# Patient Record
Sex: MALE | Race: OTHER | NOT HISPANIC OR LATINO
[De-identification: names, ages, dates, MRNs, and addresses within clinical notes are randomized per-mention and may not be internally consistent; named-entity substitution may affect disease eponyms.]

---

## 2022-01-28 PROBLEM — Z00.00 ENCOUNTER FOR PREVENTIVE HEALTH EXAMINATION: Status: ACTIVE | Noted: 2022-01-28

## 2022-02-01 ENCOUNTER — NON-APPOINTMENT (OUTPATIENT)
Age: 52
End: 2022-02-01

## 2022-02-01 ENCOUNTER — APPOINTMENT (OUTPATIENT)
Dept: OTOLARYNGOLOGY | Facility: CLINIC | Age: 52
End: 2022-02-01
Payer: COMMERCIAL

## 2022-02-01 VITALS
BODY MASS INDEX: 25.48 KG/M2 | SYSTOLIC BLOOD PRESSURE: 131 MMHG | OXYGEN SATURATION: 98 % | HEIGHT: 70 IN | DIASTOLIC BLOOD PRESSURE: 80 MMHG | TEMPERATURE: 97.8 F | HEART RATE: 78 BPM | WEIGHT: 178 LBS

## 2022-02-01 DIAGNOSIS — Z78.9 OTHER SPECIFIED HEALTH STATUS: ICD-10-CM

## 2022-02-01 DIAGNOSIS — Z82.3 FAMILY HISTORY OF STROKE: ICD-10-CM

## 2022-02-01 DIAGNOSIS — M26.629 ARTHRALGIA OF TEMPOROMANDIBULAR JOINT,: ICD-10-CM

## 2022-02-01 DIAGNOSIS — Z83.3 FAMILY HISTORY OF DIABETES MELLITUS: ICD-10-CM

## 2022-02-01 DIAGNOSIS — Z80.9 FAMILY HISTORY OF MALIGNANT NEOPLASM, UNSPECIFIED: ICD-10-CM

## 2022-02-01 DIAGNOSIS — I26.99 OTHER PULMONARY EMBOLISM W/OUT ACUTE COR PULMONALE: ICD-10-CM

## 2022-02-01 DIAGNOSIS — F45.8 OTHER SOMATOFORM DISORDERS: ICD-10-CM

## 2022-02-01 PROCEDURE — 31575 DIAGNOSTIC LARYNGOSCOPY: CPT

## 2022-02-01 PROCEDURE — 69210 REMOVE IMPACTED EAR WAX UNI: CPT

## 2022-02-01 PROCEDURE — 99204 OFFICE O/P NEW MOD 45 MIN: CPT | Mod: 25

## 2022-02-01 RX ORDER — AZITHROMYCIN 250 MG/1
250 TABLET, FILM COATED ORAL
Qty: 6 | Refills: 0 | Status: DISCONTINUED | COMMUNITY
Start: 2021-11-26

## 2022-02-01 RX ORDER — IPRATROPIUM BROMIDE 42 UG/1
0.06 SPRAY NASAL
Qty: 15 | Refills: 0 | Status: ACTIVE | COMMUNITY
Start: 2021-11-26

## 2022-02-01 RX ORDER — OXYCODONE AND ACETAMINOPHEN 5; 325 MG/1; MG/1
5-325 TABLET ORAL
Qty: 20 | Refills: 0 | Status: DISCONTINUED | COMMUNITY
Start: 2022-01-07

## 2022-02-01 RX ORDER — DOXYCYCLINE HYCLATE 100 MG/1
100 CAPSULE ORAL
Qty: 20 | Refills: 0 | Status: DISCONTINUED | COMMUNITY
Start: 2022-01-04

## 2022-02-01 RX ORDER — APIXABAN 5 MG/1
5 TABLET, FILM COATED ORAL
Refills: 0 | Status: ACTIVE | COMMUNITY

## 2022-02-01 RX ORDER — PREDNISONE 10 MG/1
10 TABLET ORAL
Qty: 30 | Refills: 0 | Status: DISCONTINUED | COMMUNITY
Start: 2021-11-26

## 2022-02-01 NOTE — HISTORY OF PRESENT ILLNESS
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 During the workup a R sided thyroid mass was discovered. He brings records from an outside hospital. The patient denies a family history of thyroid disorders/cancers and has had no ionizing radiation exposure. No trouble speaking or swallowing. Weight is stable. 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 Bothersome bilateral tinnitus in quiet. Denies: vertigo, ear pain, drainage, frequent infections, hx of ear surgery or IV antibiotics/chemo. His mother had an acoustic and father may have had hearing loss. Qtip use: yes\par Chronic bruxism & TMJD; his dentist is aware but he doesn't have a . Cracked one tooth.

## 2022-02-01 NOTE — DATA REVIEWED
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

## 2022-02-01 NOTE — CONSULT LETTER
[Dear  ___] : Dear  [unfilled], [Consult Letter:] : I had the pleasure of evaluating your patient, [unfilled]. [Please see my note below.] : Please see my note below. [Consult Closing:] : Thank you very much for allowing me to participate in the care of this patient.  If you have any questions, please do not hesitate to contact me. [Sincerely,] : Sincerely, [FreeTextEntry3] : JESENIA Currie Jr, MD, FAAOHNS\par Otolaryngologist\par [facility]

## 2022-02-01 NOTE — ASSESSMENT
[FreeTextEntry1] : Discussed options & will proceed w/ sono-guided FNA which will probably not require holding his Eliquis. Will call w/ results. \par \par Discussed conservative TMJ treatment including hot soaks, NSAIDs, and chewing avoidance. Asked the patient to confer with their dentist regarding a possible night splint. Discussed bruxism & its possible contribution to TMJD. Reviewed relaxation exercises & a possible trial of muscle relaxants as well as possible eventual botox.\par \par RTC for an ear cleaning in 6 months; annual audios, sooner prn any changes. Discussed the importance not putting qtips or other foreign objects in the ears. Discussed appropriate use of hearing protection & loud noise avoidance.\par \par \par

## 2022-02-01 NOTE — PROCEDURE
[de-identified] : We discussed the elevated risk of liberation of viral particles from the nasopharynx if the patient were to be asymptomatically infected with COVID-19; after weighing the risks & benefits the decision was made to proceed. The procedure was performed while wearing appropriate PPE and a camera attached to a video system was used to maximize separation from the patient. The scope was handled appropriately. \par Indication: requirement for exam not possible via anterior examination; thyroid mass\par After verbal consent and NO administration of an aerosolized phenylephrine/lidocaine mix, examination was performed with a flexible endoscope placed through the nose. Findings:\par Nasopharynx: unremarkable\par Soft palate, lateral and posterior pharyngeal walls: unremarkable\par Base of tongue & lingual tonsil: minimal retrodisplacement\par Vallecula: unremarkable\par Epiglottis: unremarkable\par Piriform sinuses and pharyngoesophageal junction: unremarkable\par Arytenoids and AE folds: mild interarytenoid edema\par Ventricle and false vocal folds: unremarkable\par True vocal folds: Smooth free edge w/ mild TA bowing; surface without ectasias or edema; normal movement bilaterally with good apposition in phonation\par Visible subglottis: unremarkable\par Other findings: ELM

## 2022-02-01 NOTE — PHYSICAL EXAM
[Binocular Microscopic Exam] : Binocular microscopic exam was performed [Laryngoscopy Performed] : laryngoscopy was performed, see procedure section for findings [Normal] : assessment of respiratory effort is normal [de-identified] : large, deep R sided nodule [de-identified] : tender w/ opening bilaterally [FreeTextEntry8] : cerumen impaction removed with a hook [FreeTextEntry9] : cerumen impaction removed with a hook [de-identified] : ground-down incisors

## 2022-02-04 ENCOUNTER — RESULT REVIEW (OUTPATIENT)
Age: 52
End: 2022-02-04

## 2022-03-30 ENCOUNTER — OUTPATIENT (OUTPATIENT)
Dept: OUTPATIENT SERVICES | Facility: HOSPITAL | Age: 52
LOS: 1 days | End: 2022-03-30
Payer: COMMERCIAL

## 2022-03-30 ENCOUNTER — APPOINTMENT (OUTPATIENT)
Dept: ULTRASOUND IMAGING | Facility: HOSPITAL | Age: 52
End: 2022-03-30

## 2022-03-30 PROCEDURE — 10005 FNA BX W/US GDN 1ST LES: CPT

## 2022-03-31 ENCOUNTER — RESULT REVIEW (OUTPATIENT)
Age: 52
End: 2022-03-31

## 2022-03-31 PROCEDURE — 88305 TISSUE EXAM BY PATHOLOGIST: CPT

## 2022-03-31 PROCEDURE — 10005 FNA BX W/US GDN 1ST LES: CPT

## 2022-03-31 PROCEDURE — 88173 CYTOPATH EVAL FNA REPORT: CPT | Mod: 26

## 2022-03-31 PROCEDURE — 88305 TISSUE EXAM BY PATHOLOGIST: CPT | Mod: 26

## 2022-03-31 PROCEDURE — 88173 CYTOPATH EVAL FNA REPORT: CPT

## 2023-06-15 ENCOUNTER — APPOINTMENT (OUTPATIENT)
Dept: OTOLARYNGOLOGY | Facility: CLINIC | Age: 53
End: 2023-06-15
Payer: COMMERCIAL

## 2023-06-15 VITALS
TEMPERATURE: 98.3 F | DIASTOLIC BLOOD PRESSURE: 89 MMHG | BODY MASS INDEX: 26.2 KG/M2 | SYSTOLIC BLOOD PRESSURE: 147 MMHG | WEIGHT: 183 LBS | OXYGEN SATURATION: 98 % | HEIGHT: 70 IN | HEART RATE: 73 BPM

## 2023-06-15 DIAGNOSIS — H61.23 IMPACTED CERUMEN, BILATERAL: ICD-10-CM

## 2023-06-15 DIAGNOSIS — H93.13 TINNITUS, BILATERAL: ICD-10-CM

## 2023-06-15 DIAGNOSIS — H90.3 SENSORINEURAL HEARING LOSS, BILATERAL: ICD-10-CM

## 2023-06-15 DIAGNOSIS — E04.1 NONTOXIC SINGLE THYROID NODULE: ICD-10-CM

## 2023-06-15 PROCEDURE — 69210 REMOVE IMPACTED EAR WAX UNI: CPT

## 2023-06-15 PROCEDURE — 99214 OFFICE O/P EST MOD 30 MIN: CPT | Mod: 25

## 2023-06-15 PROCEDURE — 99213 OFFICE O/P EST LOW 20 MIN: CPT | Mod: 25

## 2023-06-15 NOTE — PHYSICAL EXAM
[de-identified] : large, deep R sided nodule [de-identified] : ground-down incisors [Binocular Microscopic Exam] : Binocular microscopic exam was performed [Normal] : the left middle ear was normal [FreeTextEntry8] : cerumen impaction removed with a hook & suction [FreeTextEntry9] : obstructing cerumen removed with suction

## 2023-06-15 NOTE — HISTORY OF PRESENT ILLNESS
[de-identified] : Had a PE/DVT dx 1/5/22, no longer on Eliquis. During the workup a R sided thyroid mass was discovered which was FNA BC II. The patient denies a family history of thyroid disorders/cancers and has had no ionizing radiation exposure. No trouble speaking or swallowing. Weight is stable. 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 Bothersome bilateral tinnitus in quiet. Denies: vertigo, ear pain, drainage, frequent infections, hx of ear surgery or IV antibiotics/chemo. His mother had an acoustic and father may have had hearing loss. Qtip use: yes

## 2023-06-15 NOTE — DATA REVIEWED
[de-identified] : 2/22: bilat mild HFSNHL; % AU\par - Immitance testing w/ type A AU\par  [de-identified] : 3/22: FNA neg [de-identified] : thyroid 1/22: 3.7 x 2.4 x 2.7 mixed density mass in the R lobe, TR 4 [de-identified] : CT angio 1/22: bilat nonocclusive PEs and a 2 cm R thyroid hypodensity

## 2023-06-15 NOTE — ASSESSMENT
[FreeTextEntry1] : Will call w/ rpt sono results\par \par Rpt audio when able. Discussed the importance not putting qtips or other foreign objects in the ears.\par